# Patient Record
Sex: FEMALE | Race: BLACK OR AFRICAN AMERICAN | NOT HISPANIC OR LATINO | Employment: FULL TIME | URBAN - METROPOLITAN AREA
[De-identification: names, ages, dates, MRNs, and addresses within clinical notes are randomized per-mention and may not be internally consistent; named-entity substitution may affect disease eponyms.]

---

## 2019-09-25 ENCOUNTER — OFFICE VISIT (OUTPATIENT)
Dept: FAMILY MEDICINE CLINIC | Facility: CLINIC | Age: 44
End: 2019-09-25

## 2019-09-25 VITALS
BODY MASS INDEX: 24.5 KG/M2 | OXYGEN SATURATION: 99 % | DIASTOLIC BLOOD PRESSURE: 88 MMHG | HEIGHT: 63 IN | WEIGHT: 138.3 LBS | SYSTOLIC BLOOD PRESSURE: 120 MMHG | TEMPERATURE: 98 F | HEART RATE: 64 BPM

## 2019-09-25 DIAGNOSIS — Z23 NEED FOR VACCINATION: ICD-10-CM

## 2019-09-25 DIAGNOSIS — Z00.00 ENCOUNTER FOR ANNUAL GENERAL MEDICAL EXAMINATION WITHOUT ABNORMAL FINDINGS IN ADULT: Primary | ICD-10-CM

## 2019-09-25 PROCEDURE — 90715 TDAP VACCINE 7 YRS/> IM: CPT | Performed by: FAMILY MEDICINE

## 2019-09-25 PROCEDURE — 90471 IMMUNIZATION ADMIN: CPT | Performed by: FAMILY MEDICINE

## 2019-09-25 PROCEDURE — 99386 PREV VISIT NEW AGE 40-64: CPT | Performed by: FAMILY MEDICINE

## 2019-09-25 RX ORDER — MAGNESIUM CARB/ALUMINUM HYDROX 105-160MG
TABLET,CHEWABLE ORAL ONCE
COMMUNITY
End: 2022-12-02

## 2019-09-25 NOTE — PROGRESS NOTES
Chief Complaint   Patient presents with   • Allergies     np   • Annual Exam     not fasting       Subjective     Juana Koch is a 44 y.o. female and is here for a yearly physical exam. The patient reports no problems.    Denies any significant PMH. States she is doing well today. Not on any prescription medications, on supplements listed in med list. NKDA.    Walks daily. BMI 24.5. Normotensive. Non-smoker     UTD on pap and mammogram, sees GYN. No hx of breast, colon, ovarian cancer.    No FH heart dx or diabetes.     Do you take any herbs or supplements that were not prescribed by a doctor? yes. If so, these will be added to active medication list.      History reviewed. No pertinent past medical history.    Past Surgical History:   Procedure Laterality Date   •  SECTION     • LASER ABLATION OF THE CERVIX     • ROTATOR CUFF REPAIR Right 2016       Family History   Problem Relation Age of Onset   • No Known Problems Mother    • No Known Problems Brother    • No Known Problems Daughter    • No Known Problems Son    • No Known Problems Daughter        Social History     Socioeconomic History   • Marital status:      Spouse name: Not on file   • Number of children: Not on file   • Years of education: Not on file   • Highest education level: Not on file   Tobacco Use   • Smoking status: Never Smoker   • Smokeless tobacco: Never Used   Substance and Sexual Activity   • Alcohol use: Yes     Comment: 3 glasses of wine weekly   • Drug use: No   • Sexual activity: Yes     Partners: Male       Current Outpatient Medications on File Prior to Visit   Medication Sig Dispense Refill   • BIOTIN PO Take  by mouth.     • Cholecalciferol (VITAMIN D PO) Take  by mouth.     • Loratadine (CLARITIN PO) Take  by mouth.     • magnesium citrate 1.745 GM/30ML solution solution Take  by mouth 1 (One) Time.     • MILK THISTLE PO Take  by mouth.     • TURMERIC CURCUMIN PO Take  by mouth.       No current  "facility-administered medications on file prior to visit.        Review of Systems   Constitutional: Negative for activity change, chills and fever.   HENT: Negative for congestion, postnasal drip and rhinorrhea.    Eyes: Negative for blurred vision and pain.   Respiratory: Negative for cough, chest tightness and shortness of breath.    Cardiovascular: Negative for chest pain.   Gastrointestinal: Negative for abdominal pain, constipation, diarrhea, nausea and vomiting.   Endocrine: Negative for cold intolerance and heat intolerance.   Genitourinary: Negative for decreased urine volume, dysuria and frequency.   Musculoskeletal: Negative for arthralgias, back pain and myalgias.   Skin: Negative for rash and skin lesions.   Neurological: Negative for dizziness and confusion.   Psychiatric/Behavioral: Negative for agitation, behavioral problems and depressed mood.         Vitals:    09/25/19 1056   BP: 120/88   Pulse: 64   Temp: 98 °F (36.7 °C)   SpO2: 99%   Weight: 62.7 kg (138 lb 4.8 oz)   Height: 160 cm (63\")       General Appearance:  Alert, cooperative, no distress, appears stated age   Head:  Normocephalic, without obvious abnormality, atraumatic   Eyes:  PERRL, conjunctiva/corneas clear, EOM's intact.   Ears:  Normal TM's and external ear canals, both ears   Nose: Nares normal, septum midline, mucosa normal, no drainage or sinus tenderness   Throat: Lips, mucosa, and tongue normal; teeth and gums normal   Neck: Supple, symmetrical, trachea midline, no adenopathy;   thyroid: No enlargement/tenderness/nodules; no carotid  bruit   Back:  Symmetric, no curvature, ROM normal, no CVA tenderness   Lungs:  Clear to auscultation bilaterally, respirations unlabored   Chest wall:  No tenderness or deformity   Heart:  Regular rate and rhythm, S1 and S2 normal, no murmur, rub or gallop   Abdomen:  Soft, non-tender, bowel sounds active all four quadrants,   no masses, no organomegaly   Rectal:        Extremities: Extremities " normal, atraumatic, no cyanosis or edema   Pulses: 2+ and symmetric all extremities   Skin: Skin color, texture, turgor normal, no rashes or lesions   Lymph nodes: Cervical, supraclavicular, and axillary nodes normal   Neurologic: CNII-XII intact. Normal strength, sensation and reflexes   throughout        No results found for this or any previous visit.  Assessment/Plan   Healthy female exam.    Juana was seen today for allergies and annual exam.    Diagnoses and all orders for this visit:    Need for vaccination  -     Tdap Vaccine Greater Than or Equal To 8yo IM       Patient Counseling:  --Nutrition: Stressed importance of moderation in sodium/caffeine intake, saturated fat and cholesterol.  Discussed caloric balance, sufficient intake of fresh fruits, vegetables, fiber, calcium, iron.  --Exercise: Stressed the importance of regular exercise.   --Substance Abuse: Discussed cessation/primary prevention of tobacco, alcohol, or other drug use; driving or other dangerous activities under the influence.    --Dental health: Discussed importance of regular tooth brushing, flossing, and dental visits.  -- suggested having eyes and vision checked if needed or past due.  --Immunizations reviewed.  --Discussed benefits of screening colonoscopy at age 50.    3. Discussed the patient's BMI with her.  The BMI is in the acceptable range  4. Follow up as needed for acute illness    There are no Patient Instructions on file for this visit.    There are no discontinued medications.       Got flu shot today, as well as Tdap. Will return for fasting labs    Sangita Woods MD  [facility]

## 2021-03-09 ENCOUNTER — APPOINTMENT (OUTPATIENT)
Dept: VACCINE CLINIC | Facility: HOSPITAL | Age: 46
End: 2021-03-09

## 2022-10-04 ENCOUNTER — OFFICE VISIT (OUTPATIENT)
Dept: FAMILY MEDICINE CLINIC | Facility: CLINIC | Age: 47
End: 2022-10-04

## 2022-10-04 VITALS
WEIGHT: 188 LBS | OXYGEN SATURATION: 97 % | SYSTOLIC BLOOD PRESSURE: 132 MMHG | HEART RATE: 73 BPM | DIASTOLIC BLOOD PRESSURE: 90 MMHG | HEIGHT: 63 IN | BODY MASS INDEX: 33.31 KG/M2

## 2022-10-04 DIAGNOSIS — Z00.00 ANNUAL PHYSICAL EXAM: Primary | ICD-10-CM

## 2022-10-04 DIAGNOSIS — Z12.11 ENCOUNTER FOR SCREENING COLONOSCOPY: ICD-10-CM

## 2022-10-04 PROCEDURE — 99386 PREV VISIT NEW AGE 40-64: CPT | Performed by: FAMILY MEDICINE

## 2022-10-04 RX ORDER — VENLAFAXINE HYDROCHLORIDE 37.5 MG/1
37.5 CAPSULE, EXTENDED RELEASE ORAL DAILY
COMMUNITY
Start: 2022-09-07

## 2022-10-04 NOTE — PROGRESS NOTES
Chief Complaint   Patient presents with   • Annual Exam       Subjective     Juana Koch is a 47 y.o. female and is here for a yearly physical exam. The patient reports problems - fatigue, unintentional weight gain.    Do you take any herbs or supplements that were not prescribed by a doctor? no. If so, these will be added to active medication list.    Colorectal screening is due    Labs overall look good, FSH not elevated has GYN appointment in   No BM issues, sleeping OK, no CATARINO symptoms  BP stable     Past Medical History:   Diagnosis Date   • Allergic     seasonal   • Anemia years ago    mild   • Anxiety 2019    mild       Past Surgical History:   Procedure Laterality Date   •  SECTION     • LASER ABLATION OF THE CERVIX     • ROTATOR CUFF REPAIR Right 2016       Family History   Problem Relation Age of Onset   • No Known Problems Mother    • No Known Problems Brother    • No Known Problems Daughter    • No Known Problems Son    • No Known Problems Daughter        Social History     Socioeconomic History   • Marital status:    Tobacco Use   • Smoking status: Never Smoker   • Smokeless tobacco: Never Used   Substance and Sexual Activity   • Alcohol use: Yes     Alcohol/week: 2.0 standard drinks     Types: 2 Glasses of wine per week     Comment: 3 glasses of wine weekly   • Drug use: No   • Sexual activity: Yes     Partners: Male       Current Outpatient Medications on File Prior to Visit   Medication Sig Dispense Refill   • Loratadine (CLARITIN PO) Take  by mouth.     • magnesium citrate 1.745 GM/30ML solution solution Take  by mouth 1 (One) Time.     • TURMERIC CURCUMIN PO Take  by mouth.     • [DISCONTINUED] BIOTIN PO Take  by mouth.     • [DISCONTINUED] Cholecalciferol (VITAMIN D PO) Take  by mouth.     • [DISCONTINUED] MILK THISTLE PO Take  by mouth.       No current facility-administered medications on file prior to visit.       Review of Systems   Constitutional: Negative for  "fever.   HENT: Negative for congestion.    Respiratory: Negative for cough.    Cardiovascular: Negative for chest pain.   Gastrointestinal: Negative for abdominal pain.   Genitourinary: Negative for decreased urine volume.   Neurological: Negative for weakness.         Vitals:    10/04/22 0922   BP: 132/90   Pulse: 73   SpO2: 97%   Weight: 85.3 kg (188 lb)   Height: 160 cm (62.99\")       General Appearance:  Alert, cooperative, no distress, appears stated age   Head:  Normocephalic, without obvious abnormality, atraumatic   Eyes:  PERRL, conjunctiva/corneas clear, EOM's intact.   Ears:  Normal TM's and external ear canals, both ears   Nose:    Throat: Lips, mucosa, and tongue normal; teeth and gums normal   Neck: Supple   Back:  Symmetric   Lungs:  Clear to auscultation bilaterally, respirations unlabored   Chest wall:     Heart:  Regular rate and rhythm, S1 and S2 normal, no murmur, rub or gallop   Abdomen:  Soft, non-tender   Rectal:        Extremities: Extremities normal   Pulses: 2+ and symmetric all extremities   Skin: Skin color normal    Lymph nodes: Cervical nodes normal   Neurologic:  Normal strength          Results for orders placed or performed in visit on 09/20/22   Follicle Stimulating Hormone    Specimen: Blood   Result Value Ref Range    FSH 2.4 mIU/mL   TSH Rfx On Abnormal To Free T4    Specimen: Blood   Result Value Ref Range    TSH 1.150 0.450 - 4.500 uIU/mL     Assessment & Plan   Healthy female exam.    Diagnoses and all orders for this visit:    1. Annual physical exam (Primary)    2. Encounter for screening colonoscopy  -     Ambulatory Referral For Screening Colonoscopy      1. Annual exam     2. Patient Counseling:  --Nutrition: Stressed importance of moderation in sodium/caffeine intake, saturated fat and cholesterol.  Discussed caloric balance, sufficient intake of fresh fruits, vegetables, fiber, calcium, iron.  --Immunizations reviewed.  --Discussed benefits of screening " colonoscopy.    3. Discussed the patient's BMI with her.  The BMI is in the acceptable range  4. Follow up as needed for acute illness        Sangita Woods MD  [facility]

## 2022-10-06 ENCOUNTER — TELEPHONE (OUTPATIENT)
Dept: GASTROENTEROLOGY | Facility: CLINIC | Age: 47
End: 2022-10-06

## 2022-10-06 NOTE — TELEPHONE ENCOUNTER
----- Message from Paul Motley sent at 10/4/2022  3:32 PM EDT -----  Contact: [phone]  Contact patient for oa

## 2022-12-02 ENCOUNTER — OFFICE VISIT (OUTPATIENT)
Dept: FAMILY MEDICINE CLINIC | Facility: CLINIC | Age: 47
End: 2022-12-02

## 2022-12-02 VITALS
HEART RATE: 73 BPM | OXYGEN SATURATION: 98 % | SYSTOLIC BLOOD PRESSURE: 120 MMHG | DIASTOLIC BLOOD PRESSURE: 82 MMHG | WEIGHT: 188 LBS | TEMPERATURE: 97.3 F | HEIGHT: 63 IN | BODY MASS INDEX: 33.31 KG/M2

## 2022-12-02 DIAGNOSIS — J01.00 ACUTE NON-RECURRENT MAXILLARY SINUSITIS: Primary | ICD-10-CM

## 2022-12-02 PROCEDURE — 99213 OFFICE O/P EST LOW 20 MIN: CPT | Performed by: FAMILY MEDICINE

## 2022-12-02 RX ORDER — AMOXICILLIN 500 MG/1
500 CAPSULE ORAL 2 TIMES DAILY
Qty: 14 CAPSULE | Refills: 0 | Status: SHIPPED | OUTPATIENT
Start: 2022-12-02 | End: 2022-12-09

## 2022-12-02 NOTE — PROGRESS NOTES
"  Chief Complaint   Patient presents with   • head congestion       Upper Respiratory Infection: Patient complains of possible sinusitis. Symptoms include sinus pain. Onset of symptoms was 7 days ago, unchanged since that time. She also c/o facial pain for the past 7 days .  She is drinking moderate amounts of fluids. Evaluation to date: none. Treatment to date: none.  Ill contacts at home or school or work discussed.    Answers for HPI/ROS submitted by the patient on 12/1/2022  Please describe your symptoms.: heavy head congestion  Have you had these symptoms before?: Yes  How long have you been having these symptoms?: 7 days  What is the primary reason for your visit?: Other    Denies any body aches, fever, chills etc       Vitals:    12/02/22 1455   BP: 120/82   Pulse: 73   Temp: 97.3 °F (36.3 °C)   SpO2: 98%   Weight: 85.3 kg (188 lb)   Height: 160 cm (62.99\")     Gen: alert  Ears: Tm's clear without redness  Nose:  Congestion  Throat:  Red without exudate, some drainage, tonsils okay  Neck: no LAD  Lung: good air movement, regular RR  Heart: RR without murmur  Skin: no rash.      Assessment & Plan   Diagnoses and all orders for this visit:    1. Acute non-recurrent maxillary sinusitis (Primary)  -     amoxicillin (AMOXIL) 500 MG capsule; Take 1 capsule by mouth 2 (Two) Times a Day for 7 days.  Dispense: 14 capsule; Refill: 0      She has used mucinex, Can trial sudafed in AM. Lots of hydration. If sinus inflammation symptoms persisting past 10 days can trial amoxicil      Tylenol or Advil as needed for pain, fever, muscle aches  Plenty of fluids  Hand washing discussed  Off work or school note given if needed.  Warm tea for throat.  Pros and cons of antibiotic use discussed    Dr. Sangita Woods MD  [place]  [facility]    "

## 2022-12-14 ENCOUNTER — OFFICE VISIT (OUTPATIENT)
Dept: FAMILY MEDICINE CLINIC | Facility: CLINIC | Age: 47
End: 2022-12-14

## 2022-12-14 VITALS
OXYGEN SATURATION: 98 % | SYSTOLIC BLOOD PRESSURE: 116 MMHG | WEIGHT: 197 LBS | TEMPERATURE: 97.7 F | HEIGHT: 63 IN | DIASTOLIC BLOOD PRESSURE: 78 MMHG | HEART RATE: 91 BPM | BODY MASS INDEX: 34.91 KG/M2

## 2022-12-14 DIAGNOSIS — M62.838 SPASM OF MUSCLE: Primary | ICD-10-CM

## 2022-12-14 DIAGNOSIS — M54.30 SCIATICA, UNSPECIFIED LATERALITY: ICD-10-CM

## 2022-12-14 PROCEDURE — 99214 OFFICE O/P EST MOD 30 MIN: CPT | Performed by: FAMILY MEDICINE

## 2022-12-14 RX ORDER — NAPROXEN 375 MG/1
375 TABLET, DELAYED RELEASE ORAL 2 TIMES DAILY WITH MEALS
Qty: 14 TABLET | Refills: 0 | Status: SHIPPED | OUTPATIENT
Start: 2022-12-14 | End: 2022-12-21

## 2022-12-14 RX ORDER — CYCLOBENZAPRINE HCL 5 MG
5 TABLET ORAL 3 TIMES DAILY PRN
Qty: 30 TABLET | Refills: 0 | Status: SHIPPED | OUTPATIENT
Start: 2022-12-14

## 2022-12-14 NOTE — PROGRESS NOTES
Chief Complaint   Patient presents with   • Back Pain     Muscle spasms       Subjective   Juana Koch is a 47 y.o. female.     History of Present Illness   Several days of lower back pain with muscle spasm and sciatica    Went to chiro given stretches. Using cold paks, bio freeze. Gentle stretches    No clear precipitating episode, no associated symptoms. No le weakness, numbness or tingling      The following portions of the patient's history were reviewed and updated as appropriate: allergies, current medications, past family history, past medical history, past social history, past surgical history and problem list.      Past Medical History:   Diagnosis Date   • Allergic     seasonal   • Anemia years ago    mild   • Anxiety 2019    mild       Past Surgical History:   Procedure Laterality Date   •  SECTION     • LASER ABLATION OF THE CERVIX     • ROTATOR CUFF REPAIR Right 2016       Family History   Problem Relation Age of Onset   • No Known Problems Mother    • No Known Problems Brother    • No Known Problems Daughter    • No Known Problems Son    • No Known Problems Daughter        Social History     Socioeconomic History   • Marital status:    Tobacco Use   • Smoking status: Never   • Smokeless tobacco: Never   Substance and Sexual Activity   • Alcohol use: Yes     Alcohol/week: 2.0 standard drinks     Types: 2 Glasses of wine per week     Comment: 3 glasses of wine weekly   • Drug use: No   • Sexual activity: Yes     Partners: Male       Current Outpatient Medications on File Prior to Visit   Medication Sig Dispense Refill   • Loratadine (CLARITIN PO) Take  by mouth.     • venlafaxine XR (EFFEXOR-XR) 37.5 MG 24 hr capsule Take 37.5 mg by mouth Daily.       No current facility-administered medications on file prior to visit.       Review of Systems   Constitutional: Negative for fever.   HENT: Negative for congestion.    Respiratory: Negative for cough.    Cardiovascular: Negative  for chest pain.   Gastrointestinal: Negative for abdominal pain.   Neurological: Negative for weakness.   Psychiatric/Behavioral: Negative for depressed mood.           Vitals:    12/14/22 1359   BP: 116/78   Pulse: 91   Temp: 97.7 °F (36.5 °C)   SpO2: 98%      Objective   Physical Exam  Vitals reviewed.   Cardiovascular:      Rate and Rhythm: Normal rate and regular rhythm.      Pulses: Normal pulses.   Pulmonary:      Effort: Pulmonary effort is normal.   Abdominal:      General: Abdomen is flat.   Neurological:      General: No focal deficit present.      Mental Status: She is alert.   Psychiatric:         Mood and Affect: Mood normal.           Assessment & Plan   Problems Addressed this Visit    None  Visit Diagnoses     Spasm of muscle    -  Primary    Relevant Medications    cyclobenzaprine (FLEXERIL) 5 MG tablet    naproxen (EC NAPROSYN) 375 MG tablet delayed-release EC tablet    Sciatica, unspecified laterality          Diagnoses       Codes Comments    Spasm of muscle    -  Primary ICD-10-CM: M62.838  ICD-9-CM: 728.85     Sciatica, unspecified laterality     ICD-10-CM: M54.30  ICD-9-CM: 724.3         Continue conservative care with anti-inflamm may try prn flexeril, provided list PT stretches    If worsens or red flags to notify us         Sangita Woods MD

## 2022-12-18 DIAGNOSIS — M62.838 SPASM OF MUSCLE: ICD-10-CM

## 2022-12-19 RX ORDER — NAPROXEN 375 MG/1
TABLET, DELAYED RELEASE ORAL
Qty: 14 TABLET | Refills: 0 | OUTPATIENT
Start: 2022-12-19